# Patient Record
Sex: MALE | Race: WHITE
[De-identification: names, ages, dates, MRNs, and addresses within clinical notes are randomized per-mention and may not be internally consistent; named-entity substitution may affect disease eponyms.]

---

## 2018-06-05 ENCOUNTER — HOSPITAL ENCOUNTER (EMERGENCY)
Dept: HOSPITAL 25 - UCEAST | Age: 48
Discharge: HOME | End: 2018-06-05
Payer: COMMERCIAL

## 2018-06-05 ENCOUNTER — HOSPITAL ENCOUNTER (EMERGENCY)
Dept: HOSPITAL 25 - ED | Age: 48
Discharge: HOME | End: 2018-06-05
Payer: COMMERCIAL

## 2018-06-05 VITALS — DIASTOLIC BLOOD PRESSURE: 76 MMHG | SYSTOLIC BLOOD PRESSURE: 115 MMHG

## 2018-06-05 VITALS — DIASTOLIC BLOOD PRESSURE: 73 MMHG | SYSTOLIC BLOOD PRESSURE: 123 MMHG

## 2018-06-05 DIAGNOSIS — J45.909: ICD-10-CM

## 2018-06-05 DIAGNOSIS — R06.02: Primary | ICD-10-CM

## 2018-06-05 DIAGNOSIS — Z82.49: ICD-10-CM

## 2018-06-05 DIAGNOSIS — Z83.3: ICD-10-CM

## 2018-06-05 DIAGNOSIS — R06.02: ICD-10-CM

## 2018-06-05 DIAGNOSIS — J45.909: Primary | ICD-10-CM

## 2018-06-05 PROCEDURE — 99282 EMERGENCY DEPT VISIT SF MDM: CPT

## 2018-06-05 PROCEDURE — 93005 ELECTROCARDIOGRAM TRACING: CPT

## 2018-06-05 PROCEDURE — 99212 OFFICE O/P EST SF 10 MIN: CPT

## 2018-06-05 PROCEDURE — 71046 X-RAY EXAM CHEST 2 VIEWS: CPT

## 2018-06-05 PROCEDURE — G0463 HOSPITAL OUTPT CLINIC VISIT: HCPCS

## 2018-06-05 NOTE — UC
Shortness of Breath HPI





- HPI Summary


HPI Summary: 





PATIENT HERE COMPLAINING OF GRADUALLY WORSENING SENSATION OF SHORTNESS OF 

BREATH.  STATES SYMPTOMS ARE EVEN AT REST.  FEELS LIKE HE IS UNABLE TO TAKE A 

FULL BREATH OF AIR AND EVERY MINUTE OR SO HAS TO TAKE A PURPOSEFUL DEEP BREATH 

IN.  HE DENIES ANY OTHER SYMPTOMS.  NO FEVER, COUGH, NASAL CONGESTION, NAUSEA, 

HEADACHE, SORE THROAT, EAR PAIN. NOT DIZZY OR LIGHTHEADED. HE DOES REPORT SOME 

BAND LIKE TIGHTNESS AROUND HIS CHEST THAT HAPPENS WHEN HE TAKES A DEEP BREATH. 

NON SMOKER. NO CHANGE IN SYMPTOMS WITH EXERTION. STATES THIS FEELS JUST LIKE 

HIS ASTHMA THAT HE HAD AS A KID BUT HASN'T HAD ANY ISSUES WITH THIS SINCE HE 

WAS A TEENAGER. DOES NOT HAVE AN INHALER AT HOME. 





- History of Current Complaint


Chief Complaint: UCChestPain


Stated Complaint: SHORTNESS OF BREATH


Time Seen by Provider: 06/05/18 17:13


Hx Obtained From: Patient


Onset/Duration: Gradual Onset, Lasting Days, Still Present


Timing: Constant


Current Severity: Moderate


Dyspnea At: Rest


Aggrevating Factors: Nothing


Alleviating Factors: Nothing


Associated Signs & Symptoms: Negative: Cough (Productive), Wheezing, Fever, 

Nasal Congestion





- Allergy/Home Medications


Allergies/Adverse Reactions: 


 Allergies











Allergy/AdvReac Type Severity Reaction Status Date / Time


 


No Known Allergies Allergy   Verified 06/05/18 18:33














PMH/Surg Hx/FS Hx/Imm Hx


Respiratory History: Asthma - IN CHILDHOOD





- Surgical History


Surgical History: Yes


Surgery Procedure, Year, and Place: 3/13/15 right hand foreign body removed-WOOD





- Family History


Known Family History: Positive: Hypertension, Diabetes





- Social History


Alcohol Use: Occasionally


Substance Use Type: None


Smoking Status (MU): Never Smoked Tobacco





Review of Systems


Constitutional: Negative


ENT: Negative


Respiratory: Shortness Of Breath


Cardiovascular: Negative


Gastrointestinal: Negative


All Other Systems Reviewed And Are Negative: Yes





Physical Exam


Triage Information Reviewed: Yes


Appearance: Well-Appearing, No Pain Distress, Well-Nourished


Vital Signs: 


 Initial Vital Signs











Temp  98.7 F   06/05/18 17:16


 


Pulse  82   06/05/18 17:16


 


Resp  20   06/05/18 17:16


 


BP  123/73   06/05/18 17:16


 


Pulse Ox  100   06/05/18 17:16











Vital Signs Reviewed: Yes


Eyes: Positive: Conjunctiva Clear


ENT: Positive: Hearing grossly normal, Pharynx normal, TMs normal


Neck: Positive: Supple, Nontender, No Lymphadenopathy


Respiratory: Positive: Lungs clear, Normal breath sounds, No respiratory 

distress, No accessory muscle use, Other: - NOTED TO TAKE A DEEP PURPOSEFUL 

BREATH EVERY MINUTE OR SO


Cardiovascular Exam: Normal


Abdomen Description: Positive: Soft


Musculoskeletal: Positive: No Edema


Neurological: Positive: Alert


Psychological: Positive: Age Appropriate Behavior


Skin: Negative: rashes





Diagnostics





- Radiology


  ** CXR


Xray Interpretation: No Acute Changes


Radiology Interpretation Completed By: Radiologist





- EKG


Cardiac Rate: NL - 76BPM


Cardiac Rhythm: Sinus: Normal


Ectopy: None


ST Segment: Normal





Re-Evaluation





- Re-Evaluation


  ** First Eval


Re-Evaluation Time: 18:00


Change: Unchanged





Shortness of Breath Dx





- Course


Course Of Treatment: NO CHANGE IN SYMPTOMS AFTER ALBUTEROL TREATMENT IN THE UC.

  CHEST X-RAY WAS UNREMARKABLE.  GIVEN PATIENT'S CONTINUED SYMPTOMS OF 

SHORTNESS OF BREATH AND SENSATION THAT HE CANNOT GET A GOOD BREATH OF AIR WILL 

SEND TO THE ED FOR FURTHER EVALUATION.  WHILE LOW RISK THERE CERTAINLY IS THE 

CONSIDERATION OF A PE.  PT OFFERED TRANSPORT BY AMBULANCE BUT DECLINES. ADVISED 

THAT BY NOT TRAVELING IN A MONITORED SETTING HE COULD BE RISKING WORSENING OF 

HIS CONDITION THAT COULD POSE A THREAT TO HIS LIFE, HEALTH AND MEDICAL SAFETY. 

HE VERBALIZES UNDERSTANDING AND CONTINUES TO DECLINE AMBULANCE TRANSFER.





- Differential Dx/Diagnosis


Provider Diagnoses: SHORTNESS OF BREATH





Discharge





- Sign-Out/Discharge


Documenting (check all that apply): Discharge/Admit/Transfer





- Discharge Plan


Condition: Stable


Disposition: HOME


Patient Education Materials:  Shortness of Breath (ED)


Referrals: 


Krystin East MD [Primary Care Provider] - If Needed


Additional Instructions: 


GO DIRECTLY TO THE [facility] ED FROM HERE FOR FURTHER EVALUATION.  YOUR CHEST X-RAY 

TODAY WAS UNREMARKABLE.  YOU HAD NO IMPROVEMENT IN SYMPTOMS AFTER AN ALBUTEROL 

NEBULIZER TREATMENT.  UNCLEAR ETIOLOGY OF YOUR SYMPTOMS BUT RECOMMEND 

EVALUATION IN THE ED TO FURTHER EVALUATE FOR MORE SERIOUS UNDERLYING PATHOLOGY 

SUCH AS A BLOOD CLOT.





YOU HAVE DECLINED AMBULANCE TRANSFER. BE ADVISED THAT BY NOT TRAVELING IN A 

MONITORED SETTING YOU COULD BE RISKING WORSENING OF YOUR CONDITION THAT COULD 

POSE A THREAT TO YOUR LIFE, HEALTH AND MEDICAL SAFETY.





- Billing Disposition and Condition


Condition: STABLE


Disposition: Home

## 2018-06-05 NOTE — RAD
Indication: Shortness of breath.



2 views of the chest including dual energy PA views demonstrate no mediastinal shift.

Heart is of normal size and configuration. Lung fields are clear. No pleural fluid or

pneumonia is identified.



IMPRESSION: No active cardiopulmonary disease is noted.

## 2018-06-05 NOTE — ED
Kenyon LUCIA Rebecca, scribed for Karin Fung MD on 06/05/18 at 1958 .





Shortness of Breath





- HPI Summary


HPI Summary: 


Pt is a 48 y/o M referred from [facility] who presents to ED c/o SOB for 4-5 days. 

SOB characterized as dyspnea at rest. Sx unchanged by albuterol Tx. Denies fever

, cough. When at [facility], he was given an albuterol treatment. PMHx asthma as a 

child, though he is unsure of what medications he used then, negative PMHx 

anxiety.  








- History of Current Complaint


Chief Complaint: EDShortnessOfBreath


Time Seen by Provider: 06/05/18 19:50


Hx Obtained From: Patient


Onset/Duration: Lasting Days - 4-5 days, Still Present


Dyspnea At: Rest


Aggrevating Factors: Nothing


Alleviating Factors: Nothing


Associated Signs & Symptoms: Negative





- Allergy/Home Medications


Allergies/Adverse Reactions: 


 Allergies











Allergy/AdvReac Type Severity Reaction Status Date / Time


 


No Known Allergies Allergy   Verified 06/05/18 18:33














PMH/Surg Hx/FS Hx/Imm Hx


Endocrine/Hematology History: 


   Denies: Hx Diabetes, Hx Thyroid Disease


Cardiovascular History: 


   Denies: Hx Hypertension, Hx Pacemaker/ICD


Respiratory History: Reports: Hx Asthma - CHILDHOOD


   Denies: Hx Chronic Obstructive Pulmonary Disease (COPD)


GI History: 


   Denies: Hx Ulcer


 History: 


   Denies: Hx Renal Disease


Musculoskeletal History: 


   Denies: Hx Arthritis


Sensory History: 


   Denies: Hx Contacts or Glasses, Hx Hearing Aid


Opthamlomology History: 


   Denies: Hx Contacts or Glasses


Psychiatric History: 


   Denies: Hx Anxiety, Hx Panic Disorder





- Surgical History


Surgery Procedure, Year, and Place: 3/13/15 right hand foreign body removed-WOOD


Infectious Disease History: No


Infectious Disease History: 


   Denies: Hx Clostridium Difficile, Hx Hepatitis, Hx Human Immunodeficiency 

Virus (HIV), Hx of Known/Suspected MRSA, Hx Shingles, Hx Tuberculosis, Hx Known/

Suspected VRE, Hx Known/Suspected VRSA, History Other Infectious Disease, 

Traveled Outside the [place] in Last 30 Days





- Family History


Known Family History: Positive: Hypertension, Diabetes





- Social History


Alcohol Use: Occasionally


Substance Use Type: Reports: None


Hx Tobacco Use: No


Smoking Status (MU): Never Smoked Tobacco





Review of Systems


Negative: Fever


Positive: Shortness Of Breath.  Negative: Cough


All Other Systems Reviewed And Are Negative: Yes





Physical Exam





- Summary


Physical Exam Summary: 


VITAL SIGNS: Reviewed.


GENERAL: ~Patient is a well-developed and nourished male who is lying 

comfortable in the stretcher. Patient is not in any acute respiratory distress.


HEAD AND FACE: No signs of trauma. No ecchymosis, hematomas or skull 

depressions. No sinus tenderness.


EYES: PERRLA, EOMI x 2, No injected conjunctiva, no nystagmus.


EARS: Hearing grossly intact. Ear canals and tympanic membranes are within 

normal limits.


MOUTH: Oropharynx within normal limits.


NECK: Supple, trachea is midline, no adenopathy, no JVD, no carotid bruit, no c-

spine tenderness, neck with full ROM.


CHEST: Symmetric, no tenderness at palpation


LUNGS: Clear to auscultation bilaterally. No wheezing or crackles.


CVS: Regular rate and rhythm, S1 and S2 present, no murmurs or gallops 

appreciated.


ABDOMEN: Soft, non-tender. No signs of distention. No rebound no guarding, and 

no masses palpated. Bowel sounds are normal.


EXTREMITIES: FROM in all major joints, no edema, no cyanosis or clubbing.


NEURO: Alert and oriented x 3. No acute neurological deficits. Speech is normal 

and follows commands.


SKIN: Dry and warm





Triage Information Reviewed: Yes


Vital Signs On Initial Exam: 


 Initial Vitals











Temp Pulse Resp BP Pulse Ox


 


 98.2 F   74   20   121/85   100 


 


 06/05/18 18:29  06/05/18 18:29  06/05/18 18:29  06/05/18 18:29  06/05/18 18:29











Vital Signs Reviewed: Yes





Diagnostics





- Vital Signs


 Vital Signs











  Temp Pulse Resp BP Pulse Ox


 


 06/05/18 18:29  98.2 F  74  20  121/85  100














- Laboratory


Lab Statement: Any lab studies that have been ordered have been reviewed, and 

results considered in the medical decision making process.





Course/Dx





- Course


Assessment/Plan: Pt is a 48 y/o M with a PMHx of asthma as a child referred 

from [facility] who presents to ED c/o dyspnea at rest for 4-5 days, treated with 

albuterol PTA, negative for fever and cough. In the ED course, he was given a 

Ventolin inhaler. Pt will be D/C to home with Dx of asthma and Rx for 

Prednisone. He understands and agrees.





- Diagnoses


Provider Diagnoses: 


 Asthma








Discharge





- Sign-Out/Discharge


Documenting (check all that apply): Discharge/Admit/Transfer





- Discharge Plan


Condition: Stable


Disposition: HOME


Prescriptions: 


predniSONE TAB* [Deltasone 20 MG TAB*] 40 mg PO DAILY #10 tab


Patient Education Materials:  Asthma (ED)


Referrals: 


Krystin East MD [Primary Care Provider] - 3 Days


Additional Instructions: 


RETURN TO ED FOR ANY NEW OR WORSENING SYMPTOMS. 





- Billing Disposition and Condition


Condition: STABLE


Disposition: Home





The documentation as recorded by the Kenyon combs Rebecca accurately 

reflects the service I personally performed and the decisions made by me, Karin Fung MD.

## 2018-06-23 ENCOUNTER — HOSPITAL ENCOUNTER (EMERGENCY)
Dept: HOSPITAL 25 - ED | Age: 48
Discharge: HOME | End: 2018-06-23
Payer: COMMERCIAL

## 2018-06-23 VITALS — DIASTOLIC BLOOD PRESSURE: 81 MMHG | SYSTOLIC BLOOD PRESSURE: 126 MMHG
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
## 2018-06-23 DIAGNOSIS — S09.90XA: Primary | ICD-10-CM
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
## 2018-06-23 PROCEDURE — 72125 CT NECK SPINE W/O DYE: CPT

## 2018-06-23 PROCEDURE — 99282 EMERGENCY DEPT VISIT SF MDM: CPT

## 2018-06-23 PROCEDURE — 70450 CT HEAD/BRAIN W/O DYE: CPT
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
## 2019-06-17 ENCOUNTER — HOSPITAL ENCOUNTER (EMERGENCY)
Dept: HOSPITAL 25 - UCEAST | Age: 49
Discharge: HOME | End: 2019-06-17
Payer: COMMERCIAL

## 2019-06-17 VITALS — DIASTOLIC BLOOD PRESSURE: 76 MMHG | SYSTOLIC BLOOD PRESSURE: 122 MMHG

## 2019-06-17 DIAGNOSIS — M75.31: Primary | ICD-10-CM

## 2019-06-17 DIAGNOSIS — M47.812: ICD-10-CM

## 2019-06-17 PROCEDURE — G0463 HOSPITAL OUTPT CLINIC VISIT: HCPCS

## 2019-06-17 PROCEDURE — 99213 OFFICE O/P EST LOW 20 MIN: CPT

## 2019-06-17 PROCEDURE — 72125 CT NECK SPINE W/O DYE: CPT
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
